# Patient Record
Sex: FEMALE | URBAN - METROPOLITAN AREA
[De-identification: names, ages, dates, MRNs, and addresses within clinical notes are randomized per-mention and may not be internally consistent; named-entity substitution may affect disease eponyms.]

---

## 2019-08-14 ENCOUNTER — HOSPITAL ENCOUNTER (EMERGENCY)
Facility: CLINIC | Age: 59
Discharge: STILL ADMITTED OR ADMINISTRATIVE | End: 2019-08-14

## 2019-08-14 NOTE — ED NOTES
"Writer accessed chart and began documenting prior to Registration realizing incorrect patient was arrived and \"roomed\" in ED under writer's presumed care (ambulance arrival).Writer deleted documentation.   "

## 2019-08-14 NOTE — ED NOTES
Bed: ED23  Expected date: 8/14/19  Expected time: 2:16 PM  Means of arrival: Ambulance  Comments:  [id number] severe back pain [id number]